# Patient Record
Sex: FEMALE | Race: BLACK OR AFRICAN AMERICAN | NOT HISPANIC OR LATINO | Employment: UNEMPLOYED | URBAN - METROPOLITAN AREA
[De-identification: names, ages, dates, MRNs, and addresses within clinical notes are randomized per-mention and may not be internally consistent; named-entity substitution may affect disease eponyms.]

---

## 2024-06-27 ENCOUNTER — LAB REQUISITION (OUTPATIENT)
Dept: LAB | Facility: CLINIC | Age: 19
End: 2024-06-27
Payer: MEDICAID

## 2024-06-27 DIAGNOSIS — R30.0 DYSURIA: ICD-10-CM

## 2024-06-27 DIAGNOSIS — Z32.00 ENCOUNTER FOR PREGNANCY TEST, RESULT UNKNOWN: ICD-10-CM

## 2024-06-27 DIAGNOSIS — N89.8 OTHER SPECIFIED NONINFLAMMATORY DISORDERS OF VAGINA: ICD-10-CM

## 2024-06-27 DIAGNOSIS — Z11.3 ENCOUNTER FOR SCREENING FOR INFECTIONS WITH A PREDOMINANTLY SEXUAL MODE OF TRANSMISSION: ICD-10-CM

## 2024-06-27 DIAGNOSIS — R10.84 GENERALIZED ABDOMINAL PAIN: ICD-10-CM

## 2024-06-27 LAB
ALBUMIN SERPL BCG-MCNC: 4.3 G/DL (ref 3.5–5.2)
ALP SERPL-CCNC: 72 U/L (ref 40–150)
ALT SERPL W P-5'-P-CCNC: 16 U/L (ref 0–50)
ANION GAP SERPL CALCULATED.3IONS-SCNC: 9 MMOL/L (ref 7–15)
AST SERPL W P-5'-P-CCNC: 17 U/L (ref 0–45)
BILIRUB SERPL-MCNC: 0.2 MG/DL
BUN SERPL-MCNC: 8.1 MG/DL (ref 6–20)
CALCIUM SERPL-MCNC: 9 MG/DL (ref 8.6–10)
CHLORIDE SERPL-SCNC: 103 MMOL/L (ref 98–107)
CREAT SERPL-MCNC: 0.64 MG/DL (ref 0.51–0.95)
DEPRECATED HCO3 PLAS-SCNC: 24 MMOL/L (ref 22–29)
EGFRCR SERPLBLD CKD-EPI 2021: >90 ML/MIN/1.73M2
GLUCOSE SERPL-MCNC: 109 MG/DL (ref 70–99)
HBV CORE AB SERPL QL IA: REACTIVE
HBV SURFACE AB SERPL IA-ACNC: 60.1 M[IU]/ML
HBV SURFACE AB SERPL IA-ACNC: REACTIVE M[IU]/ML
HBV SURFACE AG SERPL QL IA: NONREACTIVE
HCV AB SERPL QL IA: NONREACTIVE
HIV 1+2 AB+HIV1 P24 AG SERPL QL IA: NONREACTIVE
POTASSIUM SERPL-SCNC: 3.6 MMOL/L (ref 3.4–5.3)
PROT SERPL-MCNC: 7.5 G/DL (ref 6.4–8.3)
SODIUM SERPL-SCNC: 136 MMOL/L (ref 135–145)
T PALLIDUM AB SER QL: NONREACTIVE

## 2024-06-27 PROCEDURE — 82040 ASSAY OF SERUM ALBUMIN: CPT | Performed by: INTERNAL MEDICINE

## 2024-06-27 PROCEDURE — 87186 SC STD MICRODIL/AGAR DIL: CPT | Mod: ORL | Performed by: INTERNAL MEDICINE

## 2024-06-27 PROCEDURE — 87086 URINE CULTURE/COLONY COUNT: CPT | Performed by: INTERNAL MEDICINE

## 2024-06-27 PROCEDURE — 87591 N.GONORRHOEAE DNA AMP PROB: CPT | Performed by: INTERNAL MEDICINE

## 2024-06-27 PROCEDURE — 86780 TREPONEMA PALLIDUM: CPT | Performed by: INTERNAL MEDICINE

## 2024-06-27 PROCEDURE — 87591 N.GONORRHOEAE DNA AMP PROB: CPT | Mod: ORL | Performed by: INTERNAL MEDICINE

## 2024-06-27 PROCEDURE — 87086 URINE CULTURE/COLONY COUNT: CPT | Mod: ORL | Performed by: INTERNAL MEDICINE

## 2024-06-27 PROCEDURE — 87491 CHLMYD TRACH DNA AMP PROBE: CPT | Mod: ORL | Performed by: INTERNAL MEDICINE

## 2024-06-27 PROCEDURE — 87389 HIV-1 AG W/HIV-1&-2 AB AG IA: CPT | Performed by: INTERNAL MEDICINE

## 2024-06-27 PROCEDURE — 86704 HEP B CORE ANTIBODY TOTAL: CPT | Mod: ORL | Performed by: INTERNAL MEDICINE

## 2024-06-27 PROCEDURE — 86803 HEPATITIS C AB TEST: CPT | Performed by: INTERNAL MEDICINE

## 2024-06-27 PROCEDURE — 87186 SC STD MICRODIL/AGAR DIL: CPT | Performed by: INTERNAL MEDICINE

## 2024-06-27 PROCEDURE — 86780 TREPONEMA PALLIDUM: CPT | Mod: ORL | Performed by: INTERNAL MEDICINE

## 2024-06-27 PROCEDURE — 86706 HEP B SURFACE ANTIBODY: CPT | Performed by: INTERNAL MEDICINE

## 2024-06-27 PROCEDURE — 87340 HEPATITIS B SURFACE AG IA: CPT | Performed by: INTERNAL MEDICINE

## 2024-06-27 PROCEDURE — 87491 CHLMYD TRACH DNA AMP PROBE: CPT | Performed by: INTERNAL MEDICINE

## 2024-06-28 LAB
BACTERIA UR CULT: ABNORMAL
C TRACH DNA SPEC QL NAA+PROBE: NEGATIVE
N GONORRHOEA DNA SPEC QL NAA+PROBE: NEGATIVE

## 2024-08-07 ENCOUNTER — LAB REQUISITION (OUTPATIENT)
Dept: LAB | Facility: CLINIC | Age: 19
End: 2024-08-07
Payer: MEDICAID

## 2024-08-07 ENCOUNTER — TRANSCRIBE ORDERS (OUTPATIENT)
Dept: MATERNAL FETAL MEDICINE | Facility: CLINIC | Age: 19
End: 2024-08-07
Payer: MEDICAID

## 2024-08-07 DIAGNOSIS — O09.90 HIGH-RISK PREGNANCY, UNSPECIFIED TRIMESTER: Primary | ICD-10-CM

## 2024-08-07 DIAGNOSIS — O26.90 PREGNANCY RELATED CONDITION, ANTEPARTUM: Primary | ICD-10-CM

## 2024-08-07 DIAGNOSIS — O09.90 SUPERVISION OF HIGH RISK PREGNANCY, UNSPECIFIED, UNSPECIFIED TRIMESTER: ICD-10-CM

## 2024-08-07 DIAGNOSIS — R68.83 CHILLS (WITHOUT FEVER): ICD-10-CM

## 2024-08-07 LAB
ABO/RH(D): NORMAL
ANTIBODY SCREEN: NEGATIVE
SPECIMEN EXPIRATION DATE: NORMAL

## 2024-08-07 PROCEDURE — 83020 HEMOGLOBIN ELECTROPHORESIS: CPT | Mod: ORL | Performed by: MIDWIFE

## 2024-08-07 PROCEDURE — 87389 HIV-1 AG W/HIV-1&-2 AB AG IA: CPT | Mod: ORL | Performed by: MIDWIFE

## 2024-08-07 PROCEDURE — 87340 HEPATITIS B SURFACE AG IA: CPT | Mod: ORL | Performed by: MIDWIFE

## 2024-08-07 PROCEDURE — 86900 BLOOD TYPING SEROLOGIC ABO: CPT | Mod: ORL | Performed by: MIDWIFE

## 2024-08-07 PROCEDURE — 86780 TREPONEMA PALLIDUM: CPT | Mod: ORL | Performed by: MIDWIFE

## 2024-08-07 PROCEDURE — 86803 HEPATITIS C AB TEST: CPT | Mod: ORL | Performed by: MIDWIFE

## 2024-08-07 PROCEDURE — 87086 URINE CULTURE/COLONY COUNT: CPT | Mod: ORL | Performed by: MIDWIFE

## 2024-08-07 PROCEDURE — 82306 VITAMIN D 25 HYDROXY: CPT | Mod: ORL | Performed by: MIDWIFE

## 2024-08-07 PROCEDURE — 86706 HEP B SURFACE ANTIBODY: CPT | Mod: ORL | Performed by: MIDWIFE

## 2024-08-07 PROCEDURE — 85660 RBC SICKLE CELL TEST: CPT | Mod: ORL | Performed by: MIDWIFE

## 2024-08-07 PROCEDURE — 86762 RUBELLA ANTIBODY: CPT | Mod: ORL | Performed by: MIDWIFE

## 2024-08-07 PROCEDURE — 86481 TB AG RESPONSE T-CELL SUSP: CPT | Mod: ORL | Performed by: MIDWIFE

## 2024-08-07 PROCEDURE — 86787 VARICELLA-ZOSTER ANTIBODY: CPT | Mod: ORL | Performed by: MIDWIFE

## 2024-08-07 PROCEDURE — 87186 SC STD MICRODIL/AGAR DIL: CPT | Mod: ORL | Performed by: MIDWIFE

## 2024-08-08 LAB
BACTERIA UR CULT: ABNORMAL
HBV SURFACE AB SERPL IA-ACNC: 52.2 M[IU]/ML
HBV SURFACE AB SERPL IA-ACNC: REACTIVE M[IU]/ML
HBV SURFACE AG SERPL QL IA: NONREACTIVE
HCV AB SERPL QL IA: NONREACTIVE
HIV 1+2 AB+HIV1 P24 AG SERPL QL IA: NONREACTIVE
RUBV IGG SERPL QL IA: 12.2 INDEX
RUBV IGG SERPL QL IA: POSITIVE
T PALLIDUM AB SER QL: NONREACTIVE
VIT D+METAB SERPL-MCNC: 17 NG/ML (ref 20–50)
VZV IGG SER QL IA: 559.5 INDEX
VZV IGG SER QL IA: POSITIVE

## 2024-08-09 LAB
GAMMA INTERFERON BACKGROUND BLD IA-ACNC: 0.06 IU/ML
M TB IFN-G BLD-IMP: NEGATIVE
M TB IFN-G CD4+ BCKGRND COR BLD-ACNC: 9.94 IU/ML
MITOGEN IGNF BCKGRD COR BLD-ACNC: 0.02 IU/ML
MITOGEN IGNF BCKGRD COR BLD-ACNC: 0.02 IU/ML
QUANTIFERON MITOGEN: 10 IU/ML
QUANTIFERON NIL TUBE: 0.06 IU/ML
QUANTIFERON TB1 TUBE: 0.08 IU/ML
QUANTIFERON TB2 TUBE: 0.08

## 2024-08-12 LAB
ALPHA GLOBIN (HBA1 AND HBA2) DD BILL REFLEX BILL: NORMAL
HGB A MFR BLD ELPH: 57.7 %
HGB A1 MFR BLD: ABNORMAL %
HGB A2 MFR BLD ELPH: 2.8 %
HGB A2 MFR BLD: ABNORMAL %
HGB C MFR BLD ELPH: 0 %
HGB C MFR BLD: ABNORMAL %
HGB E MFR BLD: 0 %
HGB E MFR BLD: ABNORMAL %
HGB F MFR BLD ELPH: 0.3 %
HGB F MFR BLD: ABNORMAL %
HGB FRACT BLD CE-IMP: ABNORMAL
HGB FRACT BLD ELPH-IMP: ABNORMAL
HGB OTHER MFR BLD ELPH: 0 %
HGB OTHER MFR BLD: ABNORMAL %
HGB S BLD QL SOLY: POSITIVE
HGB S MFR BLD ELPH: 39.2 %
HGB S MFR BLD: ABNORMAL %
PATH INTERP BLD-IMP: ABNORMAL
SICKLE SOLUBILITY REFLEX BILL: NORMAL

## 2024-08-26 ENCOUNTER — PRE VISIT (OUTPATIENT)
Dept: MATERNAL FETAL MEDICINE | Facility: CLINIC | Age: 19
End: 2024-08-26
Payer: MEDICAID

## 2024-08-29 ENCOUNTER — OFFICE VISIT (OUTPATIENT)
Dept: MATERNAL FETAL MEDICINE | Facility: HOSPITAL | Age: 19
End: 2024-08-29
Attending: STUDENT IN AN ORGANIZED HEALTH CARE EDUCATION/TRAINING PROGRAM
Payer: MEDICAID

## 2024-08-29 ENCOUNTER — LAB (OUTPATIENT)
Dept: LAB | Facility: HOSPITAL | Age: 19
End: 2024-08-29
Attending: STUDENT IN AN ORGANIZED HEALTH CARE EDUCATION/TRAINING PROGRAM
Payer: MEDICAID

## 2024-08-29 ENCOUNTER — ANCILLARY PROCEDURE (OUTPATIENT)
Dept: ULTRASOUND IMAGING | Facility: HOSPITAL | Age: 19
End: 2024-08-29
Attending: STUDENT IN AN ORGANIZED HEALTH CARE EDUCATION/TRAINING PROGRAM
Payer: MEDICAID

## 2024-08-29 ENCOUNTER — MEDICAL CORRESPONDENCE (OUTPATIENT)
Dept: HEALTH INFORMATION MANAGEMENT | Facility: CLINIC | Age: 19
End: 2024-08-29

## 2024-08-29 DIAGNOSIS — Z31.5 ENCOUNTER FOR PROCREATIVE GENETIC COUNSELING: ICD-10-CM

## 2024-08-29 DIAGNOSIS — O26.90 PREGNANCY RELATED CONDITION, ANTEPARTUM: ICD-10-CM

## 2024-08-29 DIAGNOSIS — Z36.9 ENCOUNTER FOR ANTENATAL SCREENING: ICD-10-CM

## 2024-08-29 DIAGNOSIS — Z36.0 ENCOUNTER FOR ANTENATAL SCREENING FOR CHROMOSOMAL ANOMALIES: ICD-10-CM

## 2024-08-29 DIAGNOSIS — Z36.0 ENCOUNTER FOR ANTENATAL SCREENING FOR CHROMOSOMAL ANOMALIES: Primary | ICD-10-CM

## 2024-08-29 DIAGNOSIS — O26.90 PREGNANCY RELATED CONDITION, ANTEPARTUM: Primary | ICD-10-CM

## 2024-08-29 DIAGNOSIS — Z14.8 CARRIER OF GENETIC DISORDER: ICD-10-CM

## 2024-08-29 PROCEDURE — 96040 HC GENETIC COUNSELING, EACH 30 MINUTES: CPT

## 2024-08-29 PROCEDURE — 36415 COLL VENOUS BLD VENIPUNCTURE: CPT

## 2024-08-29 PROCEDURE — 76813 OB US NUCHAL MEAS 1 GEST: CPT

## 2024-08-29 PROCEDURE — 76813 OB US NUCHAL MEAS 1 GEST: CPT | Mod: 26 | Performed by: STUDENT IN AN ORGANIZED HEALTH CARE EDUCATION/TRAINING PROGRAM

## 2024-08-29 NOTE — PROGRESS NOTES
"Please see \"Imaging\" tab under \"Chart Review\" for details of today's visit.    Nani Foster    "

## 2024-08-29 NOTE — NURSING NOTE
Harjinder Flaherty is a  at 13w3d who presents to [facility] for scheduled genetic counseling and nuchal translucency.  SBAR given to Dr. SUSAN Foster, see note in Epic.

## 2024-08-29 NOTE — PROGRESS NOTES
[facility]  Genetic Counseling Consult    Patient:  Harjinder Flaherty  Preferred Name: Harjinder YOB: 2005   Date of Service:  8/29/24   MRN: [id number]    Harjinder was seen at the [facility] for genetic consultation. The indication for genetic counseling is  discussion of screening options . Of note, it was also determined that Harjinder recently had an abnormal hemoglobin evaluation. The patient was accompanied to this visit by their partner, Janeth.    Prior to Harjinder's appointment, our team worked with  [employer] to secure the assistance of a [employer] . However, there was no  available today. We called [employer], and they also did not have an  available. The patient elected to have her partner, Janeth, assist with translation.   IMPRESSION/ PLAN   1. Harjinder has not had genetic screening in this pregnancy but elected to have screening today.     2. During today's [facility] visit, Harjinder had a blood draw for non-invasive prenatal testing (also called NIPT, NIPS, or cell-free DNA) through [employer]). This NIPT screens for trisomy 21, 18, and 13 and the patient opted to screen for sex chromosome aneuploidies, including reported fetal sex. Results are expected in 7-10 days. The patient will be called with results and if they do not answer they requested a detailed message with results on their voicemail, including the predicted fetal sex information.  If an  is unavailable, they gave permission for results to be left in English. Patient was informed that results, including fetal sex, will be available in [employer].    3. Since the patient chose aneuploidy screening via NIPT, quad screen is NOT recommended in the second trimester. If the patient desires screening for open neural tube defects, maternal serum AFP only is recommended, ideally between 16-18  weeks gestation.    4. Per the EMR, the patient previously had a hemoglobin evaluation that was abnormal and suggestive of hemoglobinopathy (such as sickle cell carrier status). Carrier screening or UNITY NIPT could be pursued. Harjinder declines at this time. Her partner could also pursue carrier screening. He declines at this time and states he is uninsured. The couple could also share this information with their baby's future health care providers as well. Brochures on UNITY by [employer] and Foresight by [employer] were provided.    5. Informational fact sheets on sickle cell disease were provided. Links are included below under the carrier screening section of this note.    6. Harjinder had a nuchal translucency ultrasound today. Please see the ultrasound report for further details.    7. Further recommendations include a fetal anatomy level II ultrasound with MFM. The upcoming ultrasound has been scheduled for 10/07/2024.    PREGNANCY HISTORY   /Parity:     Current Age: 18 year old   Age at Delivery: 19 year old  EUFEMIA: 3/3/2025, by Last Menstrual Period                                   Gestational Age: 13w3d  This pregnancy is a single gestation.     MEDICAL HISTORY   Per the medical record, Harjinder's medical history includes anemia. For a complete review of her diagnoses and medical history, please refer to the medical record.    Harjinder had a hemoglobin evaluation on 24. Results are outlined below.  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   Laboratory findings demonstrate the presence of Hb S. The percentage of Hb S in heterozygous Hb S (trait) ranges from  35-40% and is typically an asymptomatic condition.  Homozygous Hb S (Hb SS) has predominantly Hb S without Hb A and is characterized by red blood cell sickling, severe  hemolytic anemia, vaso-occlusive crisis, and other significant clinical manifestations.   Lower values of Hb S can be seen in compound  heterozygous conditions for Hb S and alpha thalassemia. Hb S/alpha thalassemia is typically asymptomatic and associated with microcytosis. If clinically indicated, molecular confirmation by Alpha Globin (HBA1 and HBA2)   Deletion/Duplication ([facility] test #[id number]) should be considered.   Hb S/beta-plus thalassemia is typically characterized by more Hb S than Hb A with the presence of microcytosis. If microcytosis is present and Hb S/beta-plus thalassemia is suspected, Beta Globin (HBB) Sequencing ([facility] test #[id number]) is suggested.   Hemoglobin analysis should be offered to the patient's family members to assess carrier status.   Please correlate clinically and in the context of recent transfusion history.   A hemoglobin variant suggestive of Hb A2 prime identified.   Hb A2 total includes Hb A2 prime (1.2 percent), a common, clinically insignificant delta chain variant. Please correlate clinically.      Sickle Cell Solubility: Positive  INTERPRETIVE INFORMATION: Sickle Cell Solubility Reflex  Not Performed: Solubility testing for Hemoglobin S not indicated.  Positive: Positive for Hemoglobin S by HPLC and confirmed by solubility testing. Additional charges apply.  Conf Previous: Positive for Hemoglobin S by HPLC.   Solubility testing performed previously and not repeated with this submission.     FAMILY HISTORY   A three-generation pedigree was not obtained today due to our focus on other topics and time constraints. The couple was not aware of family history of sickle cell disease. Further family risk assessment could be conducted in the future, if desired.       RISK ASSESSMENT FOR INHERITED CONDITIONS AND CARRIER SCREENING OPTIONS   Expanded carrier screening is available to screen for autosomal recessive conditions and X-linked conditions in a large list of genes. Carrier screening does not test the pregnancy but gives a risk assessment for the pregnancy and future pregnancies to have the condition. Expanded  carrier screening is designed to identify carrier status for conditions that are primarily childhood or adolescent onset. Expanded carrier screening does not evaluate for adult-onset conditions such as hereditary cancer syndromes, dementia/Alzheimer's disease, or cardiovascular disease risk factors. Additionally, expanded carrier screening is not comprehensive for all known genetic diseases or inherited conditions. Carrier screening does not test for all genetic and health conditions or risk factors.     Autosomal recessive conditions happen when a mutation has been inherited from the egg and sperm and include conditions like cystic fibrosis, thalassemia, hearing loss, spinal muscular atrophy, and more. We reviewed that when both biological parents carry a harmful genetic change in a gene associated with autosomal recessive inheritance, each of their pregnancies has a 1 in 4 (25%) chance to be affected by that condition. X-linked conditions happen when a mutation has been inherited from the egg and include conditions like fragile X syndrome.With X-linked conditions, the specific risk generally depends on the chromosomal sex of the fetus, with XY individuals (generally male) being most severely affected.      screening  is performed following delivery. About [place]  Screening    Approximately 90% of couples at an increased reproductive risk for an inherited condition have no family history of that condition.     Per the EMR, the patient previously had a hemoglobin evaluation that was abnormal and suggestive of hemoglobinopathy (such as sickle cell carrier status). Carrier screening or UNITY NIPT could be pursued. Harjinder declines at this time. Her partner could also pursue carrier screening. He declines at this time and states he is uninsured. The couple could also share this information with their baby's future health care providers as well. The couple was not aware of family history of sickle cell  "disease.    Brochures on UNITY by "[employer] and Foresight by [email or web address] were provided. More information on [employer] is available under the \"genetic testing options\" section of this note.     Informational fact sheets on sickle cell disease were provided:  https://www.cdc.gov/sickle-cell/communication-resources/fact-sheets.html  https://www.nhlbi.nih.gov/resources/sickle-cell-disease-fact-sheet     If Janeth and Harjinder are both carriers of sickle cell disease, there would be a 25% chance for each pregnancy together to be affected. Her children also have risks to be carriers.    Hemoglobin is a major component of red blood cells. Hemoglobinopathies are conditions that affect the level or structure of the hemoglobin and cause conditions such as thalassemia or conditions like sickle cell disease. An individual with a hemoglobinopathy often inherited the condition from their carrier parents. Some carriers can also have symptoms. Lab values like mean corpuscular volume (MCV) can suggest a hemoglobinopathy is present when the value is low. To screen for hemoglobinopathies a hemoglobin electrophoresis can be used to screen for beta thalassemia, or other hemoglobin variants like sickle cell disease. However, alpha thalassemia is often not detected on hemoglobin electrophoresis. Carrier screening by molecular sequencing methods is also available and can be helpful, especially in the case of alpha thalassemia.    The following is true for a typical individual:  4 copies of alpha genes that encode for the alpha subunit of hemoglobin (2 copies of HBA1 gene + 2 copies of HBA2 gene)   2 copies of HBB gene that encodes for the beta subunit   2 copies of HBD gene that encodes for the delta subunit   4 copies of gamma genes that encode for the gamma subunit of hemoglobin. This is typically only expressed during fetal development and decreases after birth when the beta subunit is more represented    The typical individual has the " "following hemoglobin composition:   >87% Hemoglobin A (HbA) which is made up of two alpha subunits and two beta subunits  <3% Hemoglobin A2 (HbA2) which is made up of two alpha subunits and two delta subunits   <2% Hemoglobin F which is made up of two alpha subunits and two gamma subunits (higher hemoglobin F can be protective if the individual has a hemoglobinopathy)    Thalassemia conditions are caused by reductions in the alpha or beta subunit. Variant conditions, like sickle cell disease, are cause by mutations in the HBB gene that modify the structure or function of the beta subunit. Hemoglobinopathies have a wide spectrum of symptoms because an individual can have a condition due to the combination of different mutations or even mutations on an alpha and beta gene. Many hemoglobinopathies are screened for on the [place]  Screening program.    Sickle cell disease is a genetic condition that affects the shape of red blood cells and has significant health complications. Sickle cell disease is an autosomal recessive condition caused by a mutation in both copies of the HBB gene. Carriers, commonly said to have \"sickle cell trait\", have a mutation in one copy of their HBB gene and rarely have symptoms, expect in extreme conditions. There are also other variants of the HBB gene that can combine with a sickle cell trait to cause various hemoglobinopathy conditions. Approximately 1 in 12  [place] are a carrier for sickle cell disease.      Sickle cell disease is a hemoglobinopathy that results in abnormally shaped (sickle) red blood cells and leads to vaso-occlusive episodes and chronic hemolytic anemia. This can result in organ damage, recurrent infection, chronic pain crisis, fatigue, shortness of breath, delayed growth and development, jaundice and high blood pressure. Sickle cell disease is caused by a specific mutation in the HBB gene and is associated with autosomal recessive inheritance. " Individuals who are carriers have one functional copy of the HBB gene and one copy of the HBB gene with the sickle cell disease mutation. Individuals that are carriers for sickle disease, also known as sickle cell trait, generally do not develop symptoms of the disease. However, if both parents are carriers for sickle cell disease (both parents have sickle cell trait) they have a 25% risk of having an affected child. Other mutations in the HBB gene can also exist and are associated with other types of hemoglobinopathies (Hb CS, Hb Sbthal, Hb SE).     RISK ASSESSMENT FOR CHROMOSOME CONDITIONS   We explained that the risk for fetal chromosome abnormalities increases with maternal age. We discussed specific features of common chromosome abnormalities, including trisomy 21 (Down syndrome), trisomy 13, trisomy 18, and sex chromosome trisomies.    At age 19 at midtrimester, the risk to have a baby with Down syndrome is 1 in 1160.  At age 19 at midtrimester, the risk to have a baby with any chromosome abnormality is 1 in 580.     Harjinder has not had genetic screening in this pregnancy but elected to have screening today.      GENETIC TESTING OPTIONS   Genetic testing during a pregnancy includes screening and diagnostic procedures.      Screening tests are non-invasive which means no risk to the pregnancy and includes ultrasounds and blood work. The benefits and limitations of screening were reviewed. Screening tests provide a risk assessment (chance) specific to the pregnancy for certain fetal chromosome abnormalities but cannot definitively diagnose or exclude a fetal chromosome abnormality. Follow-up genetic counseling and consideration of diagnostic testing is recommended with any abnormal screening result. Diagnostic testing during a pregnancy is more certain and can test for more conditions. However, the tests do have a risk of miscarriage that requires careful consideration. These tests can detect fetal chromosome  abnormalities with greater than 99% certainty. Results can be compromised by maternal cell contamination or mosaicism and are limited by the resolution of current genetic testing technology.     There is no screening or diagnostic test that detects all forms of birth defects or intellectual disability.     We discussed the following screening options:   Non-invasive prenatal testing (NIPT)  Also called cell-free DNA screening because it detects chromosomes from the placenta in the pregnant person's blood  Can be done any time after 10 weeks gestation  Standard recommendation for NIPT screens for trisomy 21, trisomy 18, trisomy 13, with the option of adding sex chromosome aneuploidies, without or without predicted sex  Cannot screen for open neural tube defects, maternal serum AFP after 15 weeks is recommended  New NIPT options include screening for other trisomies, microdeletion syndromes, and in some cases fetal blood antigens. Guidelines do not recommend these conditions are included in standard screening. These options have limitations and should be discussed with a genetic counselor.   However, current (2023) ACMG guidelines do recommend that screening for one microdeletion syndrome, called 22q11.2 deletion syndrome be offered to all pregnant patients. 22q11.2 deletion syndrome has an estimated prevalence of 1 in 990 to 1 in 2148 (0.05-0.1%). Risk is not thought to increase with maternal age. Clinical features are variable but include congenital heart defects, cleft palate, developmental delays, immune system deficiencies, and hearing loss. Approximately 90% of cases are de lisha (a sporadic new change in a pregnancy). Cell-free DNA screening for 22q11.2 deletion syndrome is available with the inclusion of other microdeletion syndromes. There is less data about the performance of cell-free DNA screening for more rare microdeletions and the chance for false positives or negative may be increased.  We discussed the  limitations of cell-free DNA screening in detecting microdeletions and the possiblity of false positives and false negatives. The patient declined microdeletion syndrome screening.    [facility] NIPT  UNITY uses the same technology as standard NIPT to assess risks for genetic conditions affecting pregnancy. The testing uses massive parallel sequencing to assess placental DNA fragments and DNA fragments from the pregnant person, and based on the quantification of regions of interest can provide risk assessments for whole chromosome abnormalities and a handful of autosomal recessive conditions. The UNITY test first performs routine carrier screening for cystic fibrosis, spinal muscular atrophy and hemoglobinopathies (HBB, HBA1, HBA2) to determine carrier status of the pregnant person through sequencing. If the pregnant person is determined to be a carrier, the testing then reflexes to look for additional variants or additional changes in copy numbers, detected at low levels that would be representative of the cell-free DNA from the pregnancy.   The benefits of UNITY is that the other biological parent of the pregnancy's sample is not needed and the test is non-invasive. The testing skips traditional carrier screening and looks for the presence of the disease in the pregnancy. This is NOT an alternative to diagnostic testing such as an amniocentesis. Since UNITY is a screen, there are false positives and false negatives.     Ultrasound options may include:  Nuchal translucency (NT) ultrasound  Ultrasound between [id number]-20e2t that includes nuchal translucency measurement and nasal bone assessments  Nuchal translucency refers to the space at the back of the neck where fluid builds up. All babies at this stage have fluid and there is only concern if there is too much fluid  Nasal bone refers to the small bone in the nose. There is concern for conditions like Down syndrome if the bone cannot be seen at all  This ultrasound can  be done as part of first trimester screening, at the same time as another screen (NIPT), at the same time as a CVS, or if the patients does not want genetic screening.  Markers on ultrasound detects about 70% of pregnancies with aneuploidy  Abnormalities on NT ultrasound can also increase the risk for a birth defect, like a heart defect  Comprehensive level II ultrasound (Fetal Anatomy Ultrasound)  Ultrasound done between 18-20 weeks gestation  Screens for major birth defects and markers for aneuploidy (like trisomy 21 and trisomy 18)  Includes looking at the fetus/baby's growth, heart, organs (stomach, kidneys), placenta, and amniotic fluid    Diagnostic options:   Diagnostic testing was not reviewed today in detail due to a focus on other topics and time constraints. If the patient is interested in pursuing amniocentesis, [facility] could further discuss this option with her at her upcoming comprehensive scan, if desired.     Amniocentesis  Invasive diagnostic procedure done after 15 weeks gestation  The procedure collects a small sample of amniotic fluid for the purpose of chromosomal testing and/or other genetic testing  Diagnostic result; more than 99% sensitivity for fetal chromosome abnormalities  Testing for AFP in the amniotic fluid can test for open neural tube defects    It was a pleasure to be involved with [facility] s care. Face-to-face time of the meeting was 30 minutes.    Anjel Ventura MS, [facility]  Board Certified and [place] Licensed Genetic Counselor  [facility]  Maternal Fetal Medicine  Office: [phone]  [facility]: [phone]   Fax: [phone]  [facility]

## 2024-09-09 ENCOUNTER — LAB REQUISITION (OUTPATIENT)
Dept: LAB | Facility: CLINIC | Age: 19
End: 2024-09-09
Payer: COMMERCIAL

## 2024-09-09 DIAGNOSIS — O09.90 SUPERVISION OF HIGH RISK PREGNANCY, UNSPECIFIED, UNSPECIFIED TRIMESTER: ICD-10-CM

## 2024-09-09 LAB — SCANNED LAB RESULT: NORMAL

## 2024-09-09 PROCEDURE — 82105 ALPHA-FETOPROTEIN SERUM: CPT | Mod: ORL | Performed by: ADVANCED PRACTICE MIDWIFE

## 2024-09-09 PROCEDURE — 87591 N.GONORRHOEAE DNA AMP PROB: CPT | Mod: ORL | Performed by: ADVANCED PRACTICE MIDWIFE

## 2024-09-09 PROCEDURE — 87491 CHLMYD TRACH DNA AMP PROBE: CPT | Mod: ORL | Performed by: ADVANCED PRACTICE MIDWIFE

## 2024-09-10 ENCOUNTER — DOCUMENTATION ONLY (OUTPATIENT)
Dept: MATERNAL FETAL MEDICINE | Facility: CLINIC | Age: 19
End: 2024-09-10
Payer: COMMERCIAL

## 2024-09-10 LAB
C TRACH DNA SPEC QL NAA+PROBE: NEGATIVE
N GONORRHOEA DNA SPEC QL NAA+PROBE: NEGATIVE

## 2024-09-10 NOTE — PROGRESS NOTES
September 10, 2024    Spoke with [employer] today. They will help with setting up a time tomorrow afternoon for a [facility]  to assist with calling out Harjinder's NIPT results (low risk for screened conditions).    Anjel Ventura MS, [facility]  Board Certified and [place] Licensed Genetic Counselor  [facility]  Maternal Fetal Medicine  Office: [phone]  [facility]: [phone]   Fax: [phone]  [facility]

## 2024-09-11 ENCOUNTER — TELEPHONE (OUTPATIENT)
Dept: MATERNAL FETAL MEDICINE | Facility: HOSPITAL | Age: 19
End: 2024-09-11
Payer: COMMERCIAL

## 2024-09-11 NOTE — TELEPHONE ENCOUNTER
Addendum 9/19/24: Have not heard back from Harjinder to disclose NIPT results. Reserved time to call out NIPT results with the assistance of [employer]  tomorrow, 9/20/24.    September 11, 2024    I called Harjinder today with the assistance of a [facility]  (pre-scheduled appointment # [id number]) to discuss her NIPT results. Her partner answered and said she was not home (only phone number on file for Harjinder is also listed as her partner's number). He would like to call us back when Harjinder is available. He was informed that we may not have an  available at that time. He expressed his understanding. I will await for a call from Harjinder to review her NIPT results.    Results indicate NO ANEUPLOIDY DETECTED for chromosomes 21, 18, 13, or sex chromosomes (XY - male predicted fetal sex).     This puts her current pregnancy at low risk for Down syndrome, trisomy 18, trisomy 13 and sex chromosome abnormalities. This test is reported to have the following sensitivities: Down syndrome: 99.7%, trisomy 18: 97.9%, trisomy 13: 99.0%, monosomy X: 95.8%, XX: 97.6%, and XY: 99.1%. Although these results are reassuring, this does not replace a standard chromosome analysis from a chorionic villus sampling or amniocentesis. The results reduce, but do not eliminate, the risk for the assessed conditions.    Level II ultrasound is recommended and scheduled for 10/7/2024 with [facility].    MSAFP is the appropriate second trimester screening test for open neural tube defects; the maternal quad screen is not recommended.    Her results are available in her Epic chart for her primary OB to review.    Anjel Ventura MS, [facility]  Board Certified and [place] Licensed Genetic Counselor  [facility]  Maternal Fetal Medicine  Office: [phone]  [facility]: [phone]   Fax: [phone]  [facility]

## 2024-09-12 LAB
# FETUSES US: NORMAL
AFP MOM SERPL: 0.86
AFP SERPL-MCNC: 31 NG/ML
AGE - REPORTED: 21.4 YR
CURRENT SMOKER: NO
FAMILY MEMBER DISEASES HX: NO
GA METHOD: NORMAL
GA: NORMAL WK
IDDM PATIENT QL: NO
INTEGRATED SCN PATIENT-IMP: NORMAL
SPECIMEN DRAWN SERPL: NORMAL

## 2024-09-20 ENCOUNTER — DOCUMENTATION ONLY (OUTPATIENT)
Dept: MATERNAL FETAL MEDICINE | Facility: HOSPITAL | Age: 19
End: 2024-09-20
Payer: COMMERCIAL

## 2024-09-20 NOTE — PROGRESS NOTES
September 20, 2024    Initially called Harjinder on 9/11/24 with the assistance of a [facility]  to discuss her NIPT results. Her partner answered and said she was not home (only phone number on file for Harjinder is also listed as her partner's number). He planned to call us back when Harjinder is available.    An appointment was scheduled today with Jameson betancourt to call Harjinder regrding her low risk NIPT results. When attempting to merge the call with  to Harjinder, the call was dropped.    I contacted the OB nurse triage line with the referring OB provider and left a message informing them we have not been able to get in contact with the patient to review results, which are low risk. I provided my callback number and stated that results could be faxed as well.     Results indicate NO ANEUPLOIDY DETECTED for chromosomes 21, 18, 13, or sex chromosomes (XY - male predicted fetal sex).    Harjinder is scheduled for a comprehensive anatomy scan with [facility] on 10/07/2024..    It is a pleasure to be involved in Harjinder's care.     Anjel Ventura MS, [facility]  Board Certified and [place] Licensed Genetic Counselor  [facility]  Maternal Fetal Medicine  Office: [phone]  [facility]: [phone]   Fax: [phone]  [facility]

## 2024-10-07 ENCOUNTER — LAB REQUISITION (OUTPATIENT)
Dept: LAB | Facility: CLINIC | Age: 19
End: 2024-10-07
Payer: COMMERCIAL

## 2024-10-07 ENCOUNTER — ANCILLARY PROCEDURE (OUTPATIENT)
Dept: ULTRASOUND IMAGING | Facility: HOSPITAL | Age: 19
End: 2024-10-07
Attending: MIDWIFE
Payer: COMMERCIAL

## 2024-10-07 ENCOUNTER — OFFICE VISIT (OUTPATIENT)
Dept: MATERNAL FETAL MEDICINE | Facility: HOSPITAL | Age: 19
End: 2024-10-07
Attending: MIDWIFE
Payer: COMMERCIAL

## 2024-10-07 DIAGNOSIS — O26.90 PREGNANCY RELATED CONDITION, ANTEPARTUM: ICD-10-CM

## 2024-10-07 DIAGNOSIS — O09.90 SUPERVISION OF HIGH RISK PREGNANCY, UNSPECIFIED, UNSPECIFIED TRIMESTER: ICD-10-CM

## 2024-10-07 DIAGNOSIS — Z36.89 ENCOUNTER FOR FETAL ANATOMIC SURVEY: Primary | ICD-10-CM

## 2024-10-07 PROCEDURE — 99202 OFFICE O/P NEW SF 15 MIN: CPT | Mod: 25 | Performed by: STUDENT IN AN ORGANIZED HEALTH CARE EDUCATION/TRAINING PROGRAM

## 2024-10-07 PROCEDURE — 76805 OB US >/= 14 WKS SNGL FETUS: CPT | Mod: 26 | Performed by: STUDENT IN AN ORGANIZED HEALTH CARE EDUCATION/TRAINING PROGRAM

## 2024-10-07 PROCEDURE — 76805 OB US >/= 14 WKS SNGL FETUS: CPT

## 2024-10-07 PROCEDURE — 87088 URINE BACTERIA CULTURE: CPT | Mod: ORL | Performed by: ADVANCED PRACTICE MIDWIFE

## 2024-10-07 NOTE — NURSING NOTE
Harjinder Flaherty is a  at 19w0d who presents to [facility] for a comprehensive ultrasound.  weipass I pad  utilized for today's visit.  Pt reports positive fetal movement. Pt denies bldg/lof/change in discharge, contractions, headache, vision changes, chest pain/SOB or edema. SBAR given to Dr. PRICILA Foster, see note in Epic.      Marion Arroyo RN

## 2024-10-07 NOTE — PROGRESS NOTES
The patient was seen for an ultrasound in the [facility] today.  For a detailed report of the ultrasound examination, please see the ultrasound report which can be found under the imaging tab.     If you have questions regarding today's evaluation or if we can be of further service, please contact the Maternal-Fetal Medicine Center.    Nadege Foster MD  Maternal Fetal Medicine

## 2024-10-08 LAB
BACTERIA UR CULT: ABNORMAL
BACTERIA UR CULT: ABNORMAL

## 2024-11-25 ENCOUNTER — LAB REQUISITION (OUTPATIENT)
Dept: LAB | Facility: CLINIC | Age: 19
End: 2024-11-25
Payer: COMMERCIAL

## 2024-11-25 ENCOUNTER — HOSPITAL ENCOUNTER (INPATIENT)
Facility: CLINIC | Age: 19
LOS: 3 days | Discharge: HOME | End: 2024-11-28
Attending: MIDWIFE | Admitting: MIDWIFE
Payer: COMMERCIAL

## 2024-11-25 DIAGNOSIS — O23.02 PYELONEPHRITIS AFFECTING PREGNANCY IN SECOND TRIMESTER: Primary | ICD-10-CM

## 2024-11-25 DIAGNOSIS — R30.0 DYSURIA: ICD-10-CM

## 2024-11-25 PROBLEM — O09.93 HRP (HIGH RISK PREGNANCY), THIRD TRIMESTER: Status: ACTIVE | Noted: 2024-11-25

## 2024-11-25 PROBLEM — D64.9 ANEMIA: Status: ACTIVE | Noted: 2024-08-07

## 2024-11-25 LAB
ABO/RH(D): NORMAL
ALBUMIN SERPL BCG-MCNC: 3.4 G/DL (ref 3.5–5.2)
ALP SERPL-CCNC: 162 U/L (ref 40–150)
ALT SERPL W P-5'-P-CCNC: 9 U/L (ref 0–50)
ANION GAP SERPL CALCULATED.3IONS-SCNC: 9 MMOL/L (ref 7–15)
ANTIBODY SCREEN: NEGATIVE
AST SERPL W P-5'-P-CCNC: 37 U/L (ref 0–45)
BASOPHILS # BLD AUTO: 0 10E3/UL (ref 0–0.2)
BASOPHILS NFR BLD AUTO: 0 %
BILIRUB SERPL-MCNC: 0.2 MG/DL
BUN SERPL-MCNC: 6.1 MG/DL (ref 6–20)
CALCIUM SERPL-MCNC: 9 MG/DL (ref 8.8–10.4)
CHLORIDE SERPL-SCNC: 100 MMOL/L (ref 98–107)
CREAT SERPL-MCNC: 0.43 MG/DL (ref 0.51–0.95)
EGFRCR SERPLBLD CKD-EPI 2021: >90 ML/MIN/1.73M2
EOSINOPHIL # BLD AUTO: 0.1 10E3/UL (ref 0–0.7)
EOSINOPHIL NFR BLD AUTO: 1 %
ERYTHROCYTE [DISTWIDTH] IN BLOOD BY AUTOMATED COUNT: 11.7 % (ref 10–15)
GLUCOSE SERPL-MCNC: 86 MG/DL (ref 70–99)
HCO3 SERPL-SCNC: 23 MMOL/L (ref 22–29)
HCT VFR BLD AUTO: 29.6 % (ref 35–47)
HGB BLD-MCNC: 10.4 G/DL (ref 11.7–15.7)
IMM GRANULOCYTES # BLD: 0.1 10E3/UL
IMM GRANULOCYTES NFR BLD: 1 %
LYMPHOCYTES # BLD AUTO: 1.6 10E3/UL (ref 0.8–5.3)
LYMPHOCYTES NFR BLD AUTO: 15 %
MCH RBC QN AUTO: 31.1 PG (ref 26.5–33)
MCHC RBC AUTO-ENTMCNC: 35.1 G/DL (ref 31.5–36.5)
MCV RBC AUTO: 89 FL (ref 78–100)
MONOCYTES # BLD AUTO: 1.3 10E3/UL (ref 0–1.3)
MONOCYTES NFR BLD AUTO: 12 %
NEUTROPHILS # BLD AUTO: 7.5 10E3/UL (ref 1.6–8.3)
NEUTROPHILS NFR BLD AUTO: 71 %
NRBC # BLD AUTO: 0 10E3/UL
NRBC BLD AUTO-RTO: 0 /100
PLATELET # BLD AUTO: 214 10E3/UL (ref 150–450)
POTASSIUM SERPL-SCNC: 4.6 MMOL/L (ref 3.4–5.3)
PROT SERPL-MCNC: 7.1 G/DL (ref 6.4–8.3)
RBC # BLD AUTO: 3.34 10E6/UL (ref 3.8–5.2)
SODIUM SERPL-SCNC: 132 MMOL/L (ref 135–145)
SPECIMEN EXPIRATION DATE: NORMAL
WBC # BLD AUTO: 10.6 10E3/UL (ref 4–11)

## 2024-11-25 PROCEDURE — 99222 1ST HOSP IP/OBS MODERATE 55: CPT | Mod: 4UV | Performed by: STUDENT IN AN ORGANIZED HEALTH CARE EDUCATION/TRAINING PROGRAM

## 2024-11-25 PROCEDURE — 250N000013 HC RX MED GY IP 250 OP 250 PS 637: Performed by: MIDWIFE

## 2024-11-25 PROCEDURE — 120N000002 HC R&B MED SURG/OB UMMC

## 2024-11-25 PROCEDURE — G0463 HOSPITAL OUTPT CLINIC VISIT: HCPCS

## 2024-11-25 PROCEDURE — 80053 COMPREHEN METABOLIC PANEL: CPT | Performed by: MIDWIFE

## 2024-11-25 PROCEDURE — 86900 BLOOD TYPING SEROLOGIC ABO: CPT | Performed by: MIDWIFE

## 2024-11-25 PROCEDURE — 87086 URINE CULTURE/COLONY COUNT: CPT | Mod: ORL | Performed by: INTERNAL MEDICINE

## 2024-11-25 PROCEDURE — 85004 AUTOMATED DIFF WBC COUNT: CPT | Performed by: MIDWIFE

## 2024-11-25 PROCEDURE — 82040 ASSAY OF SERUM ALBUMIN: CPT | Performed by: MIDWIFE

## 2024-11-25 PROCEDURE — 250N000011 HC RX IP 250 OP 636: Performed by: MIDWIFE

## 2024-11-25 PROCEDURE — 87186 SC STD MICRODIL/AGAR DIL: CPT | Mod: ORL | Performed by: INTERNAL MEDICINE

## 2024-11-25 PROCEDURE — 84132 ASSAY OF SERUM POTASSIUM: CPT | Performed by: MIDWIFE

## 2024-11-25 PROCEDURE — 85048 AUTOMATED LEUKOCYTE COUNT: CPT | Performed by: MIDWIFE

## 2024-11-25 PROCEDURE — 82728 ASSAY OF FERRITIN: CPT

## 2024-11-25 RX ORDER — DOCUSATE SODIUM 100 MG/1
100 CAPSULE, LIQUID FILLED ORAL 2 TIMES DAILY
Status: DISCONTINUED | OUTPATIENT
Start: 2024-11-25 | End: 2024-11-28 | Stop reason: HOSPADM

## 2024-11-25 RX ORDER — ONDANSETRON 4 MG/1
4 TABLET, ORALLY DISINTEGRATING ORAL EVERY 6 HOURS PRN
Status: DISCONTINUED | OUTPATIENT
Start: 2024-11-25 | End: 2024-11-28 | Stop reason: HOSPADM

## 2024-11-25 RX ORDER — CEFTRIAXONE 1 G/1
1 INJECTION, POWDER, FOR SOLUTION INTRAMUSCULAR; INTRAVENOUS EVERY 24 HOURS
Status: DISCONTINUED | OUTPATIENT
Start: 2024-11-25 | End: 2024-11-27

## 2024-11-25 RX ORDER — ONDANSETRON 2 MG/ML
4 INJECTION INTRAMUSCULAR; INTRAVENOUS EVERY 6 HOURS PRN
Status: DISCONTINUED | OUTPATIENT
Start: 2024-11-25 | End: 2024-11-28 | Stop reason: HOSPADM

## 2024-11-25 RX ORDER — METOCLOPRAMIDE HYDROCHLORIDE 5 MG/ML
10 INJECTION INTRAMUSCULAR; INTRAVENOUS EVERY 6 HOURS PRN
Status: DISCONTINUED | OUTPATIENT
Start: 2024-11-25 | End: 2024-11-28 | Stop reason: HOSPADM

## 2024-11-25 RX ORDER — METOCLOPRAMIDE 10 MG/1
10 TABLET ORAL EVERY 6 HOURS PRN
Status: DISCONTINUED | OUTPATIENT
Start: 2024-11-25 | End: 2024-11-28 | Stop reason: HOSPADM

## 2024-11-25 RX ORDER — FERROUS SULFATE 325(65) MG
1 TABLET ORAL
COMMUNITY
Start: 2024-08-07

## 2024-11-25 RX ORDER — PROCHLORPERAZINE MALEATE 10 MG
10 TABLET ORAL EVERY 6 HOURS PRN
Status: DISCONTINUED | OUTPATIENT
Start: 2024-11-25 | End: 2024-11-28 | Stop reason: HOSPADM

## 2024-11-25 RX ORDER — CALCIUM CARBONATE 500 MG/1
1000 TABLET, CHEWABLE ORAL EVERY 6 HOURS PRN
Status: DISCONTINUED | OUTPATIENT
Start: 2024-11-25 | End: 2024-11-28 | Stop reason: HOSPADM

## 2024-11-25 RX ORDER — LIDOCAINE 40 MG/G
CREAM TOPICAL
Status: DISCONTINUED | OUTPATIENT
Start: 2024-11-25 | End: 2024-11-28 | Stop reason: HOSPADM

## 2024-11-25 RX ORDER — ACETAMINOPHEN 325 MG/1
650 TABLET ORAL EVERY 4 HOURS PRN
Status: DISCONTINUED | OUTPATIENT
Start: 2024-11-25 | End: 2024-11-28 | Stop reason: HOSPADM

## 2024-11-25 RX ADMIN — ACETAMINOPHEN 650 MG: 325 TABLET, FILM COATED ORAL at 18:33

## 2024-11-25 RX ADMIN — CEFTRIAXONE SODIUM 1 G: 1 INJECTION, POWDER, FOR SOLUTION INTRAMUSCULAR; INTRAVENOUS at 17:54

## 2024-11-25 ASSESSMENT — ACTIVITIES OF DAILY LIVING (ADL)
ADLS_ACUITY_SCORE: 23
ADLS_ACUITY_SCORE: 41
ADLS_ACUITY_SCORE: 23

## 2024-11-25 NOTE — H&P
"ADMIT NOTE  =================  26w0d    Harjinder Flaherty is a 19 year old female with an Patient's last menstrual period was 2024. and Estimated Date of Delivery: Mar 3, 2025 is [facility] the [facility] on 2024 at 4:56 PM with for observation.  Evaluation  and treatment of suspected pyelo .   Spouse speaks English  pt prefers he translates at this time  declines/Mandingo   HPI  ================  Pt reports onset of dysuria x 2 days  she noted body aches, feeling feverish, chills last night, and now reporting L flank pain   She was initially evaluated by Dr Carmona at [facility]  clinic  TODAY  UA  + Nitrites, Mod Leuk Estrace, mod bacteria, 10-25 WBC   URINE CULTURE IS PENDING   Contractions- none  Fetal movement- active  ROM- no   Vaginal bleeding- none  GBS- not done  FOB- is involved, Gasing    Weight gain- 141 - 117 lbs, Total weight gain- 24 lbs  Height- 5'3\"  BMI- 20  First prenatal visit at 10 weeks, Total visits- 4    PROBLEM LIST  =================  Patient Active Problem List    Diagnosis Date Noted    HRP (high risk pregnancy), third trimester 2024     Priority: Medium    Pyelonephritis affecting pregnancy in second trimester 2024     Priority: Medium    Anemia 2024     Priority: Medium     Supplements prescribed         HISTORIES  ============  No Known Allergies  Past Medical History:   Diagnosis Date    History of suicidal ideation     Malaria 2016    Urinary tract infection 2024     Past Surgical History:   Procedure Laterality Date    NO PAST SURGERIES     .  No family history on file.  Social History     Tobacco Use    Smoking status: Not on file    Smokeless tobacco: Not on file   Substance Use Topics    Alcohol use: Not on file     OB History    Para Term  AB Living   1 0 0 0 0 0   SAB IAB Ectopic Multiple Live Births   0 0 0 0 0      # Outcome Date GA Lbr Gian/2nd Weight Sex Type Anes PTL Lv   1 Current                 LABS:   ===========  Prenatal " "Labs:  Rhogam not indicated   Lab Results   Component Value Date    AS Negative 08/07/2024    HEPBANG Nonreactive 08/07/2024   Other labs:  No results found for this or any previous visit (from the past 24 hours).    ROS  =========  Pt denies significant respiratory, cardiovacular, GI,reporting body aches, chills Left flank pain  dysuria See RN data base ROS.       PHYSICAL EXAM:  ===============  Temp 99.4  F (37.4  C)   Ht 1.575 m (5' 2\")   Wt 65.5 kg (144 lb 6.4 oz)   LMP 05/27/2024   BMI 26.41 kg/m    General appearance: comfortable  GENERAL APPEARANCE: healthy, alert and no distress  RESP: lungs clear to auscultation - no rales, rhonchi or wheezes  CV: regular rates and rhythm, normal S1 S2, no S3 or S4 and no murmur,and no varicosities  ABDOMEN:  soft, nontender, no epigastric pain  SKIN: no suspicious lesions or rashes  NEURO: Denies headache, blurred vision, other vision changes  PSYCH: mentation appears normal. and affect normal/bright     + CVAT Left Flank   Legs: Reflexes normal bilaterally     Abdomen: gravid, vertex fetus per Leopold's, non-tender   CONTRACTIONS: none  FETAL HEART TONES: continuous EFM- baseline 150 with moderate variability and positive accelerations. No decelerations.  PELVIC EXAM: deferred   BLOODY SHOW: no   ROM:no    ASSESSMENT:  ==============  18 yo G1 IUP @ 26w0d admitted suspected pyelonephritis    Fetal Heart Rate - category one  GBS- not done    Patient Active Problem List   Diagnosis    Anemia    HRP (high risk pregnancy), third trimester    Pyelonephritis affecting pregnancy in second trimester        PLAN:  ===========  Admit - see IP orders  URINE CULTURE IS PENDING    MD on call Dr Hunter  consulted and agrees with plan.   Advise start Ceftriaxone 1 gm q 24 hours until improved   Pt admitted to antepartum   Will Transfer care to MD service    Medically Ready for Discharge: Anticipated in 2-4 Days      ISADORA Sherwood CNM     "

## 2024-11-26 LAB
BACTERIA UR CULT: ABNORMAL
BASOPHILS # BLD AUTO: 0 10E3/UL (ref 0–0.2)
BASOPHILS NFR BLD AUTO: 0 %
EOSINOPHIL # BLD AUTO: 0.1 10E3/UL (ref 0–0.7)
EOSINOPHIL NFR BLD AUTO: 1 %
ERYTHROCYTE [DISTWIDTH] IN BLOOD BY AUTOMATED COUNT: 11.8 % (ref 10–15)
FERRITIN SERPL-MCNC: 82 NG/ML (ref 6–175)
HCT VFR BLD AUTO: 28.2 % (ref 35–47)
HGB BLD-MCNC: 10.1 G/DL (ref 11.7–15.7)
IMM GRANULOCYTES # BLD: 0.1 10E3/UL
IMM GRANULOCYTES NFR BLD: 1 %
LYMPHOCYTES # BLD AUTO: 1.8 10E3/UL (ref 0.8–5.3)
LYMPHOCYTES NFR BLD AUTO: 18 %
MCH RBC QN AUTO: 32.1 PG (ref 26.5–33)
MCHC RBC AUTO-ENTMCNC: 35.8 G/DL (ref 31.5–36.5)
MCV RBC AUTO: 90 FL (ref 78–100)
MONOCYTES # BLD AUTO: 1.5 10E3/UL (ref 0–1.3)
MONOCYTES NFR BLD AUTO: 15 %
NEUTROPHILS # BLD AUTO: 6.5 10E3/UL (ref 1.6–8.3)
NEUTROPHILS NFR BLD AUTO: 65 %
NRBC # BLD AUTO: 0 10E3/UL
NRBC BLD AUTO-RTO: 0 /100
PLATELET # BLD AUTO: 190 10E3/UL (ref 150–450)
RBC # BLD AUTO: 3.15 10E6/UL (ref 3.8–5.2)
WBC # BLD AUTO: 9.9 10E3/UL (ref 4–11)

## 2024-11-26 PROCEDURE — 85004 AUTOMATED DIFF WBC COUNT: CPT

## 2024-11-26 PROCEDURE — 250N000013 HC RX MED GY IP 250 OP 250 PS 637: Performed by: MIDWIFE

## 2024-11-26 PROCEDURE — 250N000011 HC RX IP 250 OP 636: Performed by: MIDWIFE

## 2024-11-26 PROCEDURE — 120N000002 HC R&B MED SURG/OB UMMC

## 2024-11-26 PROCEDURE — 36415 COLL VENOUS BLD VENIPUNCTURE: CPT

## 2024-11-26 RX ORDER — CYCLOBENZAPRINE HCL 10 MG
10 TABLET ORAL 3 TIMES DAILY PRN
Status: DISCONTINUED | OUTPATIENT
Start: 2024-11-26 | End: 2024-11-28 | Stop reason: HOSPADM

## 2024-11-26 RX ADMIN — ACETAMINOPHEN 650 MG: 325 TABLET, FILM COATED ORAL at 03:10

## 2024-11-26 RX ADMIN — DOCUSATE SODIUM 100 MG: 100 CAPSULE, LIQUID FILLED ORAL at 19:44

## 2024-11-26 RX ADMIN — ACETAMINOPHEN 650 MG: 325 TABLET, FILM COATED ORAL at 08:27

## 2024-11-26 RX ADMIN — DOCUSATE SODIUM 100 MG: 100 CAPSULE, LIQUID FILLED ORAL at 08:27

## 2024-11-26 RX ADMIN — CEFTRIAXONE SODIUM 1 G: 1 INJECTION, POWDER, FOR SOLUTION INTRAMUSCULAR; INTRAVENOUS at 17:14

## 2024-11-26 RX ADMIN — ACETAMINOPHEN 650 MG: 325 TABLET, FILM COATED ORAL at 17:25

## 2024-11-26 ASSESSMENT — ACTIVITIES OF DAILY LIVING (ADL)
ADLS_ACUITY_SCORE: 25
ADLS_ACUITY_SCORE: 25
ADLS_ACUITY_SCORE: 23
ADLS_ACUITY_SCORE: 23
ADLS_ACUITY_SCORE: 25
ADLS_ACUITY_SCORE: 23
ADLS_ACUITY_SCORE: 25
ADLS_ACUITY_SCORE: 23
ADLS_ACUITY_SCORE: 23
ADLS_ACUITY_SCORE: 25
ADLS_ACUITY_SCORE: 25
ADLS_ACUITY_SCORE: 23
ADLS_ACUITY_SCORE: 25
ADLS_ACUITY_SCORE: 23
ADLS_ACUITY_SCORE: 23
ADLS_ACUITY_SCORE: 25
ADLS_ACUITY_SCORE: 25
ADLS_ACUITY_SCORE: 23
ADLS_ACUITY_SCORE: 25
ADLS_ACUITY_SCORE: 23
ADLS_ACUITY_SCORE: 25
ADLS_ACUITY_SCORE: 25
ADLS_ACUITY_SCORE: 23

## 2024-11-26 NOTE — CONSULTS
"OB Consult Note    Contacted by Kady Hawkins CNM to assess Harjinder Flaherty due to concern of pyelonephritis.     S: Patient reports left sided back pain and whole body aches. Reports dysuria prior to admission. Denies n/v. [place] feverish a home, having chills now. Denies vaginal bleeding, leakage of fluid, or contractions. Reports good fetal movement. No other acute concerns.    O:  Temp 99.4  F (37.4  C)   Ht 1.575 m (5' 2\")   Wt 65.5 kg (144 lb 6.4 oz)   LMP 2024   BMI 26.41 kg/m    Gen: NAD  Cardio: Well perfused  Pulm: Non labored breathing on room air  Abdomen: Non-tender, no CVA tenderness elicited    FHT: 140 bpm baseline, moderate variability, accels present, no decels  [place]: Intermittent contractions    A/P: 19 year old yo  at 26w0d here with symptoms and laboratory findings concerning for pyelonephritis. Patient had urinalysis earlier today which showed positive leukocyte esterase and nitrites. She has no obstetric concerns and monitoring has been re-assuring. Patient overall is clinically stable, has been afebrile here and without significant tachycardia.    # Suspected Pyelonephritis  - Admit for close monitoring and treatment  - Start Ceftriaxone 1g q 24 hours  - Encourage hydration, IV fluids as needed  - Regular diet  - Tylenol prn  - Repeat CBC in the AM  - Urine culture pending  - BID fetal monitoring    Discharge pending clinical course.      Discussed with Dr. Dale Mcbride MD  Ob/Gyn Resident, PGY-3  2024 6:38 PM     Patient was seen by the resident.  I discussed the patient with the resident during the patient's visit and the patient's assessment and plan were made jointly.      Guadalupe Hunter MD   "

## 2024-11-26 NOTE — PROVIDER NOTIFICATION
11/25/24 1653   Provider Notification   Provider Name/Title ASHER Dominguez CNM   Method of Notification At Bedside   Notification Reason Status Update     Provider came and explained the treatment plan, patient will be admitted in antepartum, will transfer care under Dr. CHERYL Hunter. Patient and her  agreed with the plan.

## 2024-11-26 NOTE — PROGRESS NOTES
11/26/24  Care Management Follow Up    CHW assisted SW with contacting someone from the [facility] ([facility]) at [phone], to ask if a referral to WIC and [employer] [facility] was made by them. CHW was on hold for 30 mins and due to unable to get a hold of someone, made the referrals for pt. WIC and car seat referrals were made today (11/26). Primary SW to follow.    Griselda Valle  Inpatient CHW  [facility] NICU & Peds Med Surg  PH: [phone]

## 2024-11-26 NOTE — PROGRESS NOTES
"Antepartum Progress Note    S: Patient feeling better today than yesterday. Continues to have left flank pain, that wraps around to the front. Tylenol has been helping. Normal fetal movement. No contractions. No vaginal bleeding, leakage of fluid. Denies fevers or chills.     O:   Patient Vitals for the past 24 hrs:   BP Temp Temp src Resp Height Weight   24 0700 110/59 98  F (36.7  C) Oral -- -- --   24 0300 129/60 99.2  F (37.3  C) Oral 18 -- --   24 2308 112/59 98  F (36.7  C) Oral 16 -- --   24 1916 109/53 -- -- -- -- --   24 1915 92/50 99.2  F (37.3  C) Oral 18 -- --   24 1646 -- -- -- -- 1.575 m (5' 2\") 65.5 kg (144 lb 6.4 oz)   24 1610 108/59 99.4  F (37.4  C) Oral 16 -- --   24 1606 -- 99.4  F (37.4  C) -- -- -- --     Gen: Appears comfortable, NAD  CV:  Regular rate  Pulm: non-labored breathing  Abd: Gravid, suprapubic tenderness to palpation  Ext: Warm, well perfused, no edema bilaterally     Weight:   Wt Readings from Last 2 Encounters:   24 65.5 kg (144 lb 6.4 oz)     FHT: 135 baseline, moderate variability, 10x10 accels, occasional variable decel  TOCO: 0-2 ctx in 10 minute period    Labs: WBC 10.6 > 9.9   Imaging: Anatomy US wnl     A/P: Harjinder Flaherty is a 19 year old  female at 26w1d by 6w6d US, HD#2 admitted for pyelonephritis. Pregnancy notable for anemia, sickle cell trait. Clinically improving.     # Pyelonephritis   - UA pos LE, nitrites; L CVA Tenderness   - afebrile since inpatient, orally hydrating, HR nl   - D#2 Ceftriaxone, will narrow to PO when urine culture results  - tylenol, flexeril prn for pain      #Anemia  - after infection resolved, consider outpatient IV Fe   - ferritin added on to admission labs     # PNC  - Rh pos, Rubella immune, GCT/GBS not yet done   - SW consulted  - next PNC visit      # FWB  - appropriate for gestational age   - BID monitoring     Medically Ready for Discharge: Anticipated Tomorrow     Patient " "seen and discussed with Dr. Foster.     Fidelia Desai MD  Obstetrics and Gynecology, PGY-2  2024 8:51 AM  ---------------                                                                                                          MFM Physician Attestation  I saw this patient with the resident and agree with the their findings and plan of care as documented in the note.  I personally reviewed her vital signs, medications, labs, pertinent imaging, and fetal monitoring.     Key findings:   Harjinder Flaherty is a 19 year old  female at 26w1d by 6w6d who is admitted for management of suspected pyelonephritis. No fever, tachycardia, or overt signs of sepsis. Urine culture >100k gram negative, awaiting sensitivities. Continue Ceftriaxone in the interim. WBC improved since initiation of antibiotics.  Maternal and fetal status reassuring.     /62   Temp 97.6  F (36.4  C) (Axillary)   Resp 18   Ht 1.575 m (5' 2\")   Wt 65.5 kg (144 lb 6.4 oz)   LMP 2024   BMI 26.41 kg/m       FHT: , moderate variability, accels present, no decels  [place]: 0 contraction in 10 minutes  NST interpretation for today: reactive, reassuring and appropriate for gestational age    I personally spent a total of 45 minutes, including both face-to-face and non-face-to-face on the date of the encounter, addressing the above diagnosis. Activities performed in this time include chart review, obtaining / reviewing history, performing a medically necessary evaluation, documentation and counseling/care coordination/ordering tests/ordering meds.      Nadege Foster MD  Maternal Fetal Medicine   Date of Service (when I saw the patient): 2024       "

## 2024-11-26 NOTE — PROGRESS NOTES
VSS. And patient Afebrile. Denies pre-E symptoms. Denies any LOF, bleeding or ctx. Pt reports pain, See MAR for tylenol administration.  FHR and toco monitoring charted- see flowsheets.  at bedside and attentive to patient, aiding in interpreting.  services will call at 0800 to try ans schedule a phone  to use during rounds. Continue with current plan of care. Pt aware to call nursing with any questions or concerns. Call light in reach.
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
## 2024-11-26 NOTE — PLAN OF CARE
Patient is afebrile, conscious and coherent. VSS. Patient's  is acting  for the her. Patient verbalized reduced with her pain after taking tylenol 650 mg tabs. PIV present on her right hand on saline lock. Patient denies cramping/ana or vaginal bleeding. See flow sheet for FHR and uterine activity. Offers no complaints.  Encouraged to increase fluid intake. Labs drawn, results are out. Urine culture is still pending. Receiving ceftriaxone once a day. Continue plan of care.

## 2024-11-26 NOTE — PLAN OF CARE
Goal Outcome Evaluation:    Complains of left flank pain that radiates around to the upper left side of her abdomen. Was treated once this morning with tylenol which she said helped. This afternoon was still experiencing the same pain but it had improved and patient declined medication. VSS and afebrile. States still has pain on urination but that has also improved. No bleeding noted.                                                                                                                                           Had regular contractions this morning which she did not feel. FHT's 135 with moderate variability and accelerations. No decelerations noted. Fetal movement present. Denies SROM/leaking and bleeding.    interpreted for [place] this morning. Set up an  appointment for this afternoon, but language services did not have a [employer]  so BERNA Rodríguez RN, who speaks Mandingo, helped with interpretation. Dr. Foster and Priscilla DUNN present.

## 2024-11-26 NOTE — PLAN OF CARE
Data: Patient presented to [facility]: 2024  3:55 PM.  Reason for maternal/fetal assessment dysuria, body aches and episodes of fever x 2 days, currently reporting flank pain. Patient denies leaking of vaginal fluid/rupture of membranes, vaginal bleeding, pelvic pressure, nausea, vomiting, visual disturbances, epigastric or RUQ pain, significant edema. Patient reports fetal movement is normal. Patient is a 26w0d .  Prenatal record reviewed. Pregnancy has been uncomplicated.    Vital signs wnl. Support person is present.     Action: Verbal consent for EFM. Triage assessment completed. Provider informed of patients arrival.    Response: Patient and her  verbalized agreement with plan.

## 2024-11-27 PROCEDURE — 59025 FETAL NON-STRESS TEST: CPT | Mod: 26 | Performed by: STUDENT IN AN ORGANIZED HEALTH CARE EDUCATION/TRAINING PROGRAM

## 2024-11-27 PROCEDURE — 250N000013 HC RX MED GY IP 250 OP 250 PS 637: Performed by: STUDENT IN AN ORGANIZED HEALTH CARE EDUCATION/TRAINING PROGRAM

## 2024-11-27 PROCEDURE — 250N000013 HC RX MED GY IP 250 OP 250 PS 637: Performed by: MIDWIFE

## 2024-11-27 PROCEDURE — 120N000002 HC R&B MED SURG/OB UMMC

## 2024-11-27 PROCEDURE — 99232 SBSQ HOSP IP/OBS MODERATE 35: CPT | Mod: 25 | Performed by: STUDENT IN AN ORGANIZED HEALTH CARE EDUCATION/TRAINING PROGRAM

## 2024-11-27 RX ORDER — CEFPODOXIME PROXETIL 200 MG/1
200 TABLET, FILM COATED ORAL 2 TIMES DAILY
Status: DISCONTINUED | OUTPATIENT
Start: 2024-11-27 | End: 2024-11-28 | Stop reason: HOSPADM

## 2024-11-27 RX ADMIN — CEFPODOXIME PROXETIL 200 MG: 200 TABLET, FILM COATED ORAL at 18:14

## 2024-11-27 RX ADMIN — DOCUSATE SODIUM 100 MG: 100 CAPSULE, LIQUID FILLED ORAL at 11:28

## 2024-11-27 RX ADMIN — DOCUSATE SODIUM 100 MG: 100 CAPSULE, LIQUID FILLED ORAL at 21:10

## 2024-11-27 ASSESSMENT — ACTIVITIES OF DAILY LIVING (ADL)
ADLS_ACUITY_SCORE: 25

## 2024-11-27 NOTE — PLAN OF CARE
Data: Today is hospital day two. Maternal status stable. Fetal assessment is normal.   Action: Continue with plan of care, which is BID fetal and uterine. Patient encouraged to move extremities while in bed.  Response: Patient coping with the plan of . The patient will be discharged  home tomorrow.

## 2024-11-27 NOTE — DISCHARGE SUMMARY
[facility] Discharge Summary    Harjinder Flaherty MRN# [id number]   Age: 19 year old YOB: 2005     Date of Admission:  2024  Date of Discharge:  2024  Admitting Physician:  ISADORA Sherwood [facility]  Discharge Physician:  Nadege Foster MD     Admission Diagnosis:  -  at 26w0d  - Dysuria  - Subjective fevers, chills  - Left flank pain    Discharge Diagnosis:  -  at 26w3d  - Pyelonephritis    Procedures:   - Administration of IV antibiotics (Ceftriaxone)     Consultations:   - OB/GYN  - [facility]    Medications prior to admission:  Medications Prior to Admission   Medication Sig Dispense Refill Last Dose/Taking    ferrous sulfate (FEROSUL) 325 (65 Fe) MG tablet Take 1 tablet by mouth daily (with breakfast).   2024     Brief History of Presentation:  Harjinder Flaherty is a19 year old yo  at 26w0d here with symptoms and laboratory findings concerning for pyelonephritis. Patient had urinalysis earlier today which showed positive leukocyte esterase and nitrites. She has no obstetric concerns and monitoring has been re-assuring. Patient overall is clinically stable, has been afebrile here and without significant tachycardia.     Please see full Admission H&P and OB/GYN Consult Note for further details related to patient's admission.     Hospital Course:    On HD#1, patient was admitted and started on IV Ceftriaxone for suspected pyelonephritis given symptoms of dysuria, flank pain, and subjective fevers/chills. She remained afebrile with no tachycardia. On HD#2, urine culture returned with evidence of >100,000 CFU susceptible to Ceftriaxone. On HD#3, she was transitioned to PO cefpodoxime which she tolerated without difficulty. ON HD#4, she was feeling well with no fevers, back pain, dysuria, or other concerning symptoms. She was deemed appropriate for discharge. She was discharged home with a course of cefpodoxime followed by daily keflex for  suppression.    Discharge Instructions:  Call or present to labor and delivery if you experience:   -Regular painful contractions concerning for labor   -Leakage of fluid concerning for ruptured membranes   -Decreased fetal movement   -Bright red vaginal bleeding    -Headache, vision changes, upper abdominal pain, significant increase in swelling, generalized unwell feeling    Follow up:  Follow up in [facility] clinic next week. Discussed with CNM who will help coordinate visit next week.      Discharge Medications:  Discharge Medication List as of 11/28/2024 12:20 PM        START taking these medications    Details   cefpodoxime (VANTIN) 200 MG tablet Take 1 tablet (200 mg) by mouth 2 times daily for 7 days., Disp-14 tablet, R-0, E-Prescribe      cephALEXin (KEFLEX) 250 MG capsule Take 1 capsule (250 mg) by mouth daily., Disp-60 capsule, R-1, E-Prescribe           CONTINUE these medications which have NOT CHANGED    Details   ferrous sulfate (FEROSUL) 325 (65 Fe) MG tablet Take 1 tablet by mouth daily (with breakfast)., Historical             Lashawn Hills MD  OB/GYN Resident, PGY-4

## 2024-11-27 NOTE — PROGRESS NOTES
Antepartum Progress Note    Subjective:   Doing well today. Her left flank pain has resolved. No other systemic complaints today. No contractions, leakage of fluid, or vaginal bleeding. Confirms active fetal movement.     Objective:   Patient Vitals for the past 24 hrs:   BP Temp Temp src Resp   24 1940 122/58 98.4  F (36.9  C) Oral 17   24 1456 114/62 98.1  F (36.7  C) Oral 16     Gen: Appears comfortable, NAD  CV:  Regular rate  Pulm: non-labored breathing  Abd: Gravid, non-tender  Ext: Warm, well perfused, no edema bilaterally     Weight:   Wt Readings from Last 2 Encounters:   24 65.5 kg (144 lb 6.4 oz)     FHT: 135 baseline, moderate variability, 10x10 accels, occasional variable decel, improved with repositioning. Prolonged monitoring performed.  TOCO: 0 ctx in 10 minute period    Labs: WBC 10.6 > 9.9   Imaging: Anatomy US wnl     A/P: Harjinder Flaherty is a 19 year old  female at 26w1d by 6w6d US, HD#3 admitted for pyelonephritis. Pregnancy notable for anemia, sickle cell trait. Clinically improving.     # Pyelonephritis   - Diagnosis based on UA pos LE, nitrites and L CVA Tenderness. Has remained afebrile and without other vital sign changes.  - Urine culture with >100k E.Coli sensitive to cephalosporins.  - S/p 2D Ceftriaxone with plan to transition to Cefpodoxime 200 mg BID for total of 10 day course.  - Plan for nightly suppression after completion of antibiotic course  - Tylenol, flexeril prn for pain    #Anemia  - after infection resolved, consider outpatient IV Fe   - ferritin 82  - s/p hemoglobinelectrophoresis - sickle cell trait    # PNC  - Rh pos, Rubella immune, GCT/GBS not yet done   - SW consulted  - next PNC visit      # FWB  - appropriate for gestational age   - BID monitoring     Medically Ready for Discharge: Anticipated Tomorrow     I personally spent a total of 45 minutes, including both face-to-face and non-face-to-face on the date of the encounter, addressing the  above diagnosis. Activities performed in this time include chart review, obtaining / reviewing history, performing a medically necessary evaluation, documentation and counseling/care coordination/ordering tests/ordering meds.      Nadege Foster MD  Maternal Fetal Medicine   Date of Service (when I saw the patient): 11/27/2024

## 2024-11-28 ENCOUNTER — HOSPITAL ENCOUNTER (INPATIENT)
Facility: CLINIC | Age: 19
End: 2024-11-28
Admitting: MIDWIFE
Payer: COMMERCIAL

## 2024-11-28 VITALS
HEIGHT: 62 IN | RESPIRATION RATE: 16 BRPM | BODY MASS INDEX: 26.57 KG/M2 | WEIGHT: 144.4 LBS | SYSTOLIC BLOOD PRESSURE: 114 MMHG | TEMPERATURE: 98 F | DIASTOLIC BLOOD PRESSURE: 59 MMHG

## 2024-11-28 PROCEDURE — 250N000013 HC RX MED GY IP 250 OP 250 PS 637: Performed by: STUDENT IN AN ORGANIZED HEALTH CARE EDUCATION/TRAINING PROGRAM

## 2024-11-28 PROCEDURE — 59025 FETAL NON-STRESS TEST: CPT | Mod: 26 | Performed by: STUDENT IN AN ORGANIZED HEALTH CARE EDUCATION/TRAINING PROGRAM

## 2024-11-28 PROCEDURE — 99232 SBSQ HOSP IP/OBS MODERATE 35: CPT | Mod: 25 | Performed by: STUDENT IN AN ORGANIZED HEALTH CARE EDUCATION/TRAINING PROGRAM

## 2024-11-28 RX ORDER — CEFPODOXIME PROXETIL 200 MG/1
200 TABLET, FILM COATED ORAL 2 TIMES DAILY
Qty: 14 TABLET | Refills: 0 | Status: SHIPPED | OUTPATIENT
Start: 2024-11-28 | End: 2024-12-05

## 2024-11-28 RX ADMIN — CEFPODOXIME PROXETIL 200 MG: 200 TABLET, FILM COATED ORAL at 08:38

## 2024-11-28 ASSESSMENT — ACTIVITIES OF DAILY LIVING (ADL)
ADLS_ACUITY_SCORE: 25

## 2024-11-28 NOTE — PLAN OF CARE
Assumed care of patient at 2300 from [facility].  Patient VSS, afebrile.  Patient offered no complaints overnight.  Paitent denies pain.  Continue with current plan of care.  Anticipate discharge today.

## 2024-11-28 NOTE — PROVIDER NOTIFICATION
11/28/24 1100   Critical SBAR   Whiteboard Notes Discharge instructions reviewed with patient with Mandingo  on phone and patient had no questions or concerns

## 2024-11-28 NOTE — PLAN OF CARE
Goal Outcome Evaluation:  Pt states is comfortable, denies feeling ctx's, pain, leaking or bleeding. Baby AGA on monitor ctx's X1.  at bedside. Pt eating and drinking well. Vitals WNL. Would like to sleep, pt tired. Saline locked flushed.

## 2024-11-28 NOTE — PROGRESS NOTES
Antepartum Progress Note    Subjective:   Doing well today. Having some mild itching on her abdomen where the bands are for the monitors. No other systemic complaints today. No contractions, leakage of fluid, or vaginal bleeding. Confirms active fetal movement.     Objective:   Patient Vitals for the past 24 hrs:   BP Temp Temp src Resp   24 0838 122/64 98.3  F (36.8  C) Oral 18   24 2107 112/56 98.7  F (37.1  C) Oral 22   24 1537 118/58 98.1  F (36.7  C) Oral 20   24 0938 107/58 98.5  F (36.9  C) Oral 16     Gen: Appears comfortable, NAD  CV:  Regular rate  Pulm: non-labored breathing  Abd: Gravid, non-tender  Ext: Warm, well perfused, no edema bilaterally     Weight:   Wt Readings from Last 2 Encounters:   24 65.5 kg (144 lb 6.4 oz)     FHT: 135 baseline, moderate variability, 10x10 accels, no decels.  TOCO: 0 ctx in 10 minute period    Labs: WBC 10.6 > 9.9   Imaging: Anatomy US wnl     A/P: Harjinder Flaherty is a 19 year old  female at 26w3d by 6w6d US, HD#4 admitted for pyelonephritis. Pregnancy notable for anemia, sickle cell trait. Clinically improving and stable on po antibiotics, appropriate for discharge today.     # Pyelonephritis   - Diagnosis based on UA pos LE, nitrites and L CVA Tenderness. Has remained afebrile and without other vital sign changes.  - Urine culture with >100k E.Coli sensitive to cephalosporins.  - S/p 2D Ceftriaxone, now on Cefpodoxime 200 mg BID for total of 10 day course.  - Plan for nightly suppression with keflex after completion of antibiotic course  - Tylenol, flexeril prn for pain    #Anemia  - after infection resolved, consider outpatient IV Fe   - ferritin 82  - s/p hemoglobinelectrophoresis - sickle cell trait    # PNC  - Rh pos, Rubella immune, GCT/GBS not yet done   - SW consulted  - next PNC visit      # FWB  - appropriate for gestational age   - BID monitoring     Medically Ready for Discharge: Ready Now     Lashawn Hills MD  OB/GYN  "Resident, PGY-4     -------------------    MFM Physician Attestation  I saw this patient with the resident and agree with the their findings and plan of care as documented in the note.  I personally reviewed her vital signs, medications, labs, pertinent imaging, and fetal monitoring.     Key findings:   aHrjinder Flaherty is a 19 year old  female at 26w3d by 6w6d who is admitted for management of suspected pyelonephritis. No fever, tachycardia, or overt signs of sepsis. Urine culture >100k E. Coli. S/p Ceftriaxone now on Cefpodoxime for 10 day course total of antibiotics. Will initiate suppression after that time. SW involved for continued management of complex social situation. Discharge to home today. OB follow-up coordinated through [facility].     /64   Temp 98.3  F (36.8  C) (Oral)   Resp 18   Ht 1.575 m (5' 2\")   Wt 65.5 kg (144 lb 6.4 oz)   LMP 2024   BMI 26.41 kg/m        FHT: , moderate variability, accels present, no decels  [place]: 0 contraction in 10 minutes  NST interpretation for today: reactive, reassuring and appropriate for gestational age     I personally spent a total of 45 minutes, including both face-to-face and non-face-to-face on the date of the encounter, addressing the above diagnosis. Activities performed in this time include chart review, obtaining / reviewing history, performing a medically necessary evaluation, documentation and counseling/care coordination/ordering tests/ordering meds.        Nadege Foster MD  Maternal Fetal Medicine   Date of Service (when I saw the patient): 2024     "

## 2024-11-28 NOTE — PLAN OF CARE
Data: [facility] for  left flank back pain  . The patient reported on admission left sided back pain and whole body aches. Reports dysuria and feeling feverish at home prior to admission.  Cervical exam deferred. Membranes intact. Contractions are not present. See flowsheets for fetal assessment documentation.     Action: Presumed adequate fetal oxygenation documented. Patient treated for Pyelonephritis with IV antibiotics and now on oral antibiotics. The patient was discharged with oral cefpodoxine for 7 days and will take cephalexin until delivery. Discharge instructions reviewed. Patient instructed to report change in fetal movement, vaginal leaking of fluid or bleeding, abdominal pain, or any concerns related to the pregnancy to provider/clinic. [facility]    Response: Orders to discharge home per Dr. Willis and Dr. Foster. Patient verbalized understanding of education and agreement with plan. Discharged to home at 1347.

## 2024-12-09 ENCOUNTER — LAB REQUISITION (OUTPATIENT)
Dept: LAB | Facility: CLINIC | Age: 19
End: 2024-12-09
Payer: COMMERCIAL

## 2024-12-09 DIAGNOSIS — O09.90 SUPERVISION OF HIGH RISK PREGNANCY, UNSPECIFIED, UNSPECIFIED TRIMESTER: ICD-10-CM

## 2024-12-09 LAB — T PALLIDUM AB SER QL: NONREACTIVE

## 2024-12-09 PROCEDURE — 86780 TREPONEMA PALLIDUM: CPT | Mod: ORL | Performed by: ADVANCED PRACTICE MIDWIFE

## 2024-12-09 PROCEDURE — 82306 VITAMIN D 25 HYDROXY: CPT | Mod: ORL | Performed by: ADVANCED PRACTICE MIDWIFE

## 2024-12-10 LAB — VIT D+METAB SERPL-MCNC: 18 NG/ML (ref 20–50)
